# Patient Record
Sex: MALE | Race: BLACK OR AFRICAN AMERICAN | NOT HISPANIC OR LATINO | URBAN - METROPOLITAN AREA
[De-identification: names, ages, dates, MRNs, and addresses within clinical notes are randomized per-mention and may not be internally consistent; named-entity substitution may affect disease eponyms.]

---

## 2022-12-20 ENCOUNTER — EMERGENCY (EMERGENCY)
Facility: HOSPITAL | Age: 52
LOS: 0 days | Discharge: HOME | End: 2022-12-20
Payer: COMMERCIAL

## 2022-12-20 VITALS
DIASTOLIC BLOOD PRESSURE: 92 MMHG | OXYGEN SATURATION: 100 % | HEART RATE: 64 BPM | WEIGHT: 205.03 LBS | HEIGHT: 77 IN | RESPIRATION RATE: 18 BRPM | SYSTOLIC BLOOD PRESSURE: 138 MMHG | TEMPERATURE: 98 F

## 2022-12-20 DIAGNOSIS — J02.9 ACUTE PHARYNGITIS, UNSPECIFIED: ICD-10-CM

## 2022-12-20 DIAGNOSIS — U07.1 COVID-19: ICD-10-CM

## 2022-12-20 DIAGNOSIS — Z91.040 LATEX ALLERGY STATUS: ICD-10-CM

## 2022-12-20 DIAGNOSIS — I10 ESSENTIAL (PRIMARY) HYPERTENSION: ICD-10-CM

## 2022-12-20 DIAGNOSIS — R51.9 HEADACHE, UNSPECIFIED: ICD-10-CM

## 2022-12-20 DIAGNOSIS — R09.81 NASAL CONGESTION: ICD-10-CM

## 2022-12-20 LAB
FLUAV AG NPH QL: SIGNIFICANT CHANGE UP
FLUBV AG NPH QL: SIGNIFICANT CHANGE UP
SARS-COV-2 RNA SPEC QL NAA+PROBE: DETECTED

## 2022-12-20 PROCEDURE — 99283 EMERGENCY DEPT VISIT LOW MDM: CPT

## 2022-12-20 NOTE — ED ADULT NURSE NOTE - NSSUHOSCREENINGYN_ED_ALL_ED
No - the patient is unable to be screened due to medical condition
3 = assistive equipment and person
Self

## 2022-12-20 NOTE — ED PROVIDER NOTE - CHIEF COMPLAINT
The patient is a 52y Male complaining of cold symptoms.

## 2022-12-20 NOTE — ED PROVIDER NOTE - CLINICAL SUMMARY MEDICAL DECISION MAKING FREE TEXT BOX
52y M pw likely COVID. Paxlovid contract indicated w/ supportive measures endorsed to pt. Will provide work note and pt agreeable to plan.

## 2022-12-20 NOTE — ED PROVIDER NOTE - OBJECTIVE STATEMENT
52y M w/ no pertinent PMHx presenting for COVID test. Pt states he works at [employer] in [place] as an  and believes he was exposed to virus. Pt endorses nasal congestion, sore throat, and ha x8 days. Pt states he took at home COVID test which was positive and was later sent by PCP to get an ER COVID test. Denies fev/chills, CP, SOB, or other concerning symptoms.

## 2022-12-20 NOTE — ED ADULT NURSE NOTE - OBJECTIVE STATEMENT
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 pt says he took a home test which was positive. hx htn

## 2022-12-20 NOTE — ED PROVIDER NOTE - NSFOLLOWUPINSTRUCTIONS_ED_ALL_ED_FT
If you test positive and are an older adult or someone who is at high risk of getting sick from COVID-19, treatment may be available. Contact a healthcare provider right away after a positive test to determine if you are eligible, even if your symptoms are mild right now. You can also visit a Test to Treat location and, if eligible, receive a prescription from a provider. Don't delay: Treatment must be started within the first few days to be effective.    If you have a fever, cough, or other symptoms, you might have COVID-19. Most people have mild illness and are able to recover at home. If you are sick:  •Keep track of your symptoms.      •If you have an emergency warning sign (including trouble breathing), call 911.  Return to the ED immediately if you have *shortness of breath*, fever, pain, weakness, vomiting any concerns.

## 2022-12-20 NOTE — ED PROVIDER NOTE - PATIENT PORTAL LINK FT
You can access the FollowMyHealth Patient Portal offered by [facility] by registering at the following website: [email or web address]. By joining [facility]’s FollowMyHealth portal, you will also be able to view your health information using other applications (apps) compatible with our system.